# Patient Record
Sex: MALE | Race: BLACK OR AFRICAN AMERICAN | NOT HISPANIC OR LATINO | URBAN - METROPOLITAN AREA
[De-identification: names, ages, dates, MRNs, and addresses within clinical notes are randomized per-mention and may not be internally consistent; named-entity substitution may affect disease eponyms.]

---

## 2023-06-28 ENCOUNTER — OFFICE VISIT (OUTPATIENT)
Dept: URGENT CARE | Facility: CLINIC | Age: 53
End: 2023-06-28
Payer: COMMERCIAL

## 2023-06-28 VITALS
WEIGHT: 130 LBS | DIASTOLIC BLOOD PRESSURE: 82 MMHG | SYSTOLIC BLOOD PRESSURE: 115 MMHG | OXYGEN SATURATION: 97 % | HEIGHT: 67 IN | RESPIRATION RATE: 17 BRPM | HEART RATE: 82 BPM | TEMPERATURE: 98 F | BODY MASS INDEX: 20.4 KG/M2

## 2023-06-28 DIAGNOSIS — B02.9 HERPES ZOSTER WITHOUT COMPLICATION: Primary | ICD-10-CM

## 2023-06-28 DIAGNOSIS — M79.2 NEURALGIA: ICD-10-CM

## 2023-06-28 PROCEDURE — 99203 OFFICE O/P NEW LOW 30 MIN: CPT | Mod: S$GLB,,, | Performed by: PHYSICIAN ASSISTANT

## 2023-06-28 PROCEDURE — 99203 PR OFFICE/OUTPT VISIT, NEW, LEVL III, 30-44 MIN: ICD-10-PCS | Mod: S$GLB,,, | Performed by: PHYSICIAN ASSISTANT

## 2023-06-28 RX ORDER — IBUPROFEN 600 MG/1
600 TABLET ORAL EVERY 8 HOURS PRN
Qty: 30 TABLET | Refills: 0 | Status: SHIPPED | OUTPATIENT
Start: 2023-06-29

## 2023-06-28 RX ORDER — GABAPENTIN 300 MG/1
CAPSULE ORAL
Qty: 90 CAPSULE | Refills: 0 | Status: SHIPPED | OUTPATIENT
Start: 2023-06-28 | End: 2023-08-01

## 2023-06-28 NOTE — PROGRESS NOTES
"Subjective:      Patient ID: Juan Manuel Wayne is a 52 y.o. male.    Vitals:  height is 5' 7" (1.702 m) and weight is 59 kg (130 lb). His oral temperature is 97.5 °F (36.4 °C). His blood pressure is 115/82 and his pulse is 82. His respiration is 17 and oxygen saturation is 97%.     Chief Complaint: Rash    52-year-old male who is overall very healthy presents to urgent care clinic for evaluation.  Complaining of symptoms of started Sunday and today is the 4th day.  Complaining of rash on his left upper back that is radiating to his left anterior chest.  There is blistering other rash and associated significant pain/numbness/tingling.  Pain is rated as 10/10.  There is associated bodyaches. No fever, chills, open wound, injury, or new exposure. took Tylenol for his Symptoms yesterday.  No previous similar symptoms.  No sick contacts.    Medical assistant note:  Pt states that he has a painful rash on his back and abdominal area for the pain 4 days . Pt has used a icy hot patch last night     Rash  This is a new problem. The current episode started in the past 7 days. The problem is unchanged. The affected locations include the back, chest and torso. The rash is characterized by pain and redness. He was exposed to nothing. Pertinent negatives include no anorexia, congestion, cough, diarrhea, eye pain, facial edema, fatigue, fever, joint pain, nail changes, rhinorrhea, shortness of breath, sore throat or vomiting. Treatments tried: icy hot patch. The treatment provided no relief.     Constitution: Negative for activity change, chills, sweating, fatigue, fever and generalized weakness.   HENT:  Negative for ear pain, hearing loss, facial swelling, congestion, postnasal drip, sinus pain, sinus pressure, sore throat, trouble swallowing and voice change.    Neck: Negative for neck pain, neck stiffness and painful lymph nodes.   Cardiovascular:  Negative for chest pain, leg swelling, palpitations, sob on exertion and " passing out.   Eyes:  Negative for eye discharge, eye pain, eye redness, photophobia, vision loss, double vision, blurred vision and eyelid swelling.   Respiratory:  Negative for chest tightness, cough, sputum production, COPD, shortness of breath, wheezing and asthma.    Gastrointestinal:  Negative for abdominal pain, nausea, vomiting, diarrhea, bright red blood in stool, dark colored stools, rectal bleeding, heartburn and bowel incontinence.   Genitourinary:  Negative for dysuria, frequency, urgency, urine decreased, flank pain, bladder incontinence, hematuria and history of kidney stones.   Musculoskeletal:  Positive for pain. Negative for trauma, joint pain, joint swelling, abnormal ROM of joint, muscle cramps and muscle ache.   Skin:  Positive for rash and erythema. Negative for color change, pale and wound.   Allergic/Immunologic: Negative for seasonal allergies, asthma and immunocompromised state.   Neurological:  Positive for numbness and tingling. Negative for dizziness, history of vertigo, light-headedness, passing out, facial drooping, speech difficulty, coordination disturbances, loss of balance, headaches, disorientation, altered mental status, loss of consciousness and seizures.   Hematologic/Lymphatic: Negative for swollen lymph nodes, easy bruising/bleeding and trouble clotting. Does not bruise/bleed easily.   Psychiatric/Behavioral:  Negative for altered mental status and disorientation.       History reviewed. No pertinent past medical history.    Objective:     Physical Exam   Constitutional: He appears well-developed. He is cooperative.  Non-toxic appearance. He does not appear ill. No distress.      Comments:Well-appearing     HENT:   Head: Normocephalic and atraumatic.   Ears:   Right Ear: Hearing, external ear and ear canal normal.   Left Ear: Hearing, external ear and ear canal normal.   Nose: Nose normal.   Eyes: Conjunctivae and EOM are normal. Pupils are equal, round, and reactive to  light. Right eye exhibits no discharge. Left eye exhibits no discharge. Right eye exhibits normal extraocular motion. Left eye exhibits normal extraocular motion. Extraocular movement intact vision grossly intact gaze aligned appropriately   Neck: Phonation normal. Neck supple.   Cardiovascular: Normal rate and regular rhythm.   Pulmonary/Chest: Effort normal. No accessory muscle usage. No respiratory distress. He has no wheezes.   Abdominal: Normal appearance. He exhibits no distension.   Musculoskeletal:      Right lower leg: No edema.      Left lower leg: No edema.      Comments: Moves all extremities with normal tone, strength, and ROM.    Gait normal.   Neurological: no focal deficit. He is alert and at baseline. He has normal motor skills. He displays no weakness, facial symmetry, normal reflexes and no dysarthria. No cranial nerve deficit or sensory deficit. He exhibits normal muscle tone. Gait and coordination normal. Coordination normal.   Skin: Skin is warm, dry, intact, not diaphoretic and rash. Capillary refill takes less than 2 seconds. erythema         Comments: Erythematous vesicular rash at posterior paraspinal T4-5 radiating to left anterior chest.  No open wound, drainage, or warmth.   Psychiatric: His speech is normal and behavior is normal. Mood, affect, judgment and thought content normal.   Nursing note and vitals reviewed.            Assessment:     1. Herpes zoster without complication    2. Neuralgia      On exam, patient is nontoxic appearing and vitals are stable.  Patient is essentially neurovascularly intact on exam.   no concern for anaphylaxis, sepsis, or cellulitis.     We did discuss that he is out of the window for antiviral treatment at this time since it has been 4 days.  Patient was prescribed pain medications and recommended OTC treatments for their symptoms.      We discussed gabapentin taper for his nerve pain.  We discussed side effects/alternatives/benefits/risk and patient  would like to proceed.  He knows to discontinue if he develops severe somnolence, leg swelling, or blurred vision.  We also discussed contact precautions.    If symptoms do not improve/worsens, patient was referred back to PCP for continued outpatient workup and management.      Patient was instructed to return for re-evaluation for any worsening or change in current symptoms. Strict ED versus clinic precautions given in depth. Discharge and follow-up instructions given verbally/printed with the patient who expressed understanding and willingness to comply with my recommendations.  Patient verbalized understanding and agreed with the entirety of plan of care.     Note dictated with voice recognition software, please excuse any grammatical errors.    Plan:       Herpes zoster without complication  -     ibuprofen (ADVIL,MOTRIN) 600 MG tablet; Take 1 tablet (600 mg total) by mouth every 8 (eight) hours as needed for Pain (take with food for pain).  Dispense: 30 tablet; Refill: 0    Neuralgia  -     gabapentin (NEURONTIN) 300 MG capsule; Take 1 capsule (300 mg total) by mouth every evening for 3 days, THEN 1 capsule (300 mg total) every 12 (twelve) hours for 3 days, THEN 1 capsule (300 mg total) every 8 (eight) hours. Stop if you develop severe drowsiness, leg swelling or blurred vision..  Dispense: 90 capsule; Refill: 0             Additional MDM:     Heart Failure Score:   COPD = No    Patient Instructions   PLEASE READ YOUR DISCHARGE INSTRUCTIONS ENTIRELY AS IT CONTAINS IMPORTANT INFORMATION.    Take the valtrex (valcyclovir) 7 day course to completion.     You can take OTC pain relievers for mild pain.      FOR NERVE PAIN:  Take 300mg (1cap) every evening x3days. Then, increase to 300mg (1cap) twice daily x3days. May titrate up to 300mg three times daily as tolerated. Do not drive or operate machinery for 4-6 hours after taking this medication.  DISCONTINUE SEVERE SENSATION LEG SWELLING, OR BLURRED VISION.    Avoid  touching the rash as it can spread. Do not touch your eyes.   If you get a lesion by your eye please be seen by a doctor quickly as this can cause blindness.     Please avoid being around immunocompromised patients or pregnant patients during this time as you are very contagious.    You will no longer be contagious when your lesions form scabbing.       Please return or see your primary care doctor if you develop new or worsening symptoms.     Please arrange follow up with your primary medical clinic as soon as possible. You must understand that you've received an Urgent Care treatment only and that you may be released before all of your medical problems are known or treated. You, the patient, will arrange for follow up as instructed. If your symptoms worsen or fail to improve you should go to the Emergency Room.    WE CANNOT RULE OUT ALL POSSIBLE CAUSES OF YOUR SYMPTOMS IN THE URGENT CARE SETTING PLEASE GO TO THE ER IF YOU FEELS YOUR CONDITION IS WORSENING OR YOU WOULD LIKE EMERGENT EVALUATION.      Patient Education        Shingles Discharge Instructions   About this topic   Shingles or herpes zoster is a painful skin rash. It is caused by a germ called varicella zoster virus.  Shingles infects a nerve and the skin that the nerve supplies. It most often affects only one side of the body. The face, around the eyes, and the forehead are often involved.  The pain, itching, and rash can make people feel uncomfortable. First, you feel pain and then a rash appears as fluid-filled blisters. The fluid in the blisters can infect other people who have never had chicken pox or a vaccine. Those people will develop chicken pox, not shingles. It is important to keep the blisters covered until they dry, heal, and scabs form. Once the blisters dry, you cannot pass the infection to anyone. It may take 3 to 4 weeks for the scabs to fully heal.     What care is needed at home?   Ask your doctor what you need to do when you go home.  Make sure you ask questions if you do not understand what the doctor says.  Take your drugs and apply creams and ointments as ordered by your doctor.  Keep your blisters covered. Do not touch your dressing if you have not washed your hands. Change the dressing every day or if it gets soaked. Wash hands right away after you change your dressing.  Do not touch or scratch the rash.  Ask your doctor when it is safe to take a bath or shower.  You may use mild soap and water to wash your blisters. Pat your skin dry with a clean, dry towel. Do not rub. Make sure not to scratch your blisters when drying yourself.  Avoid cleaning your ears if your blisters are near the ear. You may scratch a blister inside your ear and worsen your condition.  What follow-up care is needed?   Your doctor may ask you to make visits to the office to check on your progress. Be sure to keep these visits.  You doctor may send you to a special doctor for nerves, eyes, or ears. Who you see depends on the part of your body the illness affects.  What drugs may be needed?   The doctor may order drugs to:  Help with pain  Relieve itchiness  Fight the virus  Keep your eyes moist  Numb the affected area  Will physical activity be limited?   Your activity may be limited for a few days. You may not feel like going out if the rash can be seen by other people.  What problems could happen?   Very bad pain that lasts after the rash goes away  Some other infection  Eye problems  Hearing problems  When do I need to call the doctor?   Very bad pain that is not helped by the drugs you are taking  Eyesight changes  Hearing problems  Another infection in the lungs or brain  Helpful tips   Wear loose-fitting clothes.  Use nonstick bandages.  Wear sunglasses when you go out to cover the affected eye and to keep it moist.  Stay away from pregnant women, infants, cancer patients, and people with low immune defenses.  If you see a rash or think that a painful part on your  body may be caused by shingles, see you doctor right away. Early treatment may shorten the length and severity of the illness.  Teach Back: Helping You Understand   The Teach Back Method helps you understand the information we are giving you. After you talk with the staff, tell them in your own words what you learned. This helps to make sure the staff has described each thing clearly. It also helps to explain things that may have been confusing. Before going home, make sure you can do these:  I can tell you about my condition.  I can tell you how I will take care of my blisters.  I can tell you what I will do if I have very bad pain or another infection.  Where can I learn more?   American Academy of Dermatology  https://www.aad.org/public/diseases/a-z/shingles-overview   Center for Disease Control and Prevention  http://www.cdc.gov/shingles/about/overview.html   FamilyDoctor.org  http://familydoctor.org/familydoctor/en/diseases-conditions/shingles/treatment.html   Last Reviewed Date   2020-10-13  Consumer Information Use and Disclaimer   This information is not specific medical advice and does not replace information you receive from your health care provider. This is only a brief summary of general information. It does NOT include all information about conditions, illnesses, injuries, tests, procedures, treatments, therapies, discharge instructions or life-style choices that may apply to you. You must talk with your health care provider for complete information about your health and treatment options. This information should not be used to decide whether or not to accept your health care providers advice, instructions or recommendations. Only your health care provider has the knowledge and training to provide advice that is right for you.  Copyright   Copyright © 2021 [employer] and its affiliates and/or licensors. All rights reserved.

## 2023-06-28 NOTE — LETTER
June 28, 2023    Juan Manuel Wayne  [address] [place] [address]         [facility]  [address], [address]  [place] [address]  Phone: [phone]  Fax: [phone] June 28, 2023     Patient: Juan Manuel Wayne   YOB: 1970   Date of Visit: 6/28/2023       To Whom It May Concern:    It is my medical opinion that Juan Manuel Wayne may return to work on 7/1/2023.    If you have any questions or concerns, please don't hesitate to call.    Sincerely,        Ray Corrales PA-C

## 2023-06-28 NOTE — PATIENT INSTRUCTIONS
PLEASE READ YOUR DISCHARGE INSTRUCTIONS ENTIRELY AS IT CONTAINS IMPORTANT INFORMATION.    Take the valtrex (valcyclovir) 7 day course to completion.     You can take OTC pain relievers for mild pain.      FOR NERVE PAIN:  Take 300mg (1cap) every evening x3days. Then, increase to 300mg (1cap) twice daily x3days. May titrate up to 300mg three times daily as tolerated. Do not drive or operate machinery for 4-6 hours after taking this medication.  DISCONTINUE SEVERE SENSATION LEG SWELLING, OR BLURRED VISION.    Avoid touching the rash as it can spread. Do not touch your eyes.   If you get a lesion by your eye please be seen by a doctor quickly as this can cause blindness.     Please avoid being around immunocompromised patients or pregnant patients during this time as you are very contagious.    You will no longer be contagious when your lesions form scabbing.       Please return or see your primary care doctor if you develop new or worsening symptoms.     Please arrange follow up with your primary medical clinic as soon as possible. You must understand that you've received an Urgent Care treatment only and that you may be released before all of your medical problems are known or treated. You, the patient, will arrange for follow up as instructed. If your symptoms worsen or fail to improve you should go to the Emergency Room.    WE CANNOT RULE OUT ALL POSSIBLE CAUSES OF YOUR SYMPTOMS IN THE URGENT CARE SETTING PLEASE GO TO THE ER IF YOU FEELS YOUR CONDITION IS WORSENING OR YOU WOULD LIKE EMERGENT EVALUATION.      Patient Education        Shingles Discharge Instructions   About this topic   Shingles or herpes zoster is a painful skin rash. It is caused by a germ called varicella zoster virus.  Shingles infects a nerve and the skin that the nerve supplies. It most often affects only one side of the body. The face, around the eyes, and the forehead are often involved.  The pain, itching, and rash can make people feel  uncomfortable. First, you feel pain and then a rash appears as fluid-filled blisters. The fluid in the blisters can infect other people who have never had chicken pox or a vaccine. Those people will develop chicken pox, not shingles. It is important to keep the blisters covered until they dry, heal, and scabs form. Once the blisters dry, you cannot pass the infection to anyone. It may take 3 to 4 weeks for the scabs to fully heal.     What care is needed at home?   Ask your doctor what you need to do when you go home. Make sure you ask questions if you do not understand what the doctor says.  Take your drugs and apply creams and ointments as ordered by your doctor.  Keep your blisters covered. Do not touch your dressing if you have not washed your hands. Change the dressing every day or if it gets soaked. Wash hands right away after you change your dressing.  Do not touch or scratch the rash.  Ask your doctor when it is safe to take a bath or shower.  You may use mild soap and water to wash your blisters. Pat your skin dry with a clean, dry towel. Do not rub. Make sure not to scratch your blisters when drying yourself.  Avoid cleaning your ears if your blisters are near the ear. You may scratch a blister inside your ear and worsen your condition.  What follow-up care is needed?   Your doctor may ask you to make visits to the office to check on your progress. Be sure to keep these visits.  You doctor may send you to a special doctor for nerves, eyes, or ears. Who you see depends on the part of your body the illness affects.  What drugs may be needed?   The doctor may order drugs to:  Help with pain  Relieve itchiness  Fight the virus  Keep your eyes moist  Numb the affected area  Will physical activity be limited?   Your activity may be limited for a few days. You may not feel like going out if the rash can be seen by other people.  What problems could happen?   Very bad pain that lasts after the rash goes away  Some  other infection  Eye problems  Hearing problems  When do I need to call the doctor?   Very bad pain that is not helped by the drugs you are taking  Eyesight changes  Hearing problems  Another infection in the lungs or brain  Helpful tips   Wear loose-fitting clothes.  Use nonstick bandages.  Wear sunglasses when you go out to cover the affected eye and to keep it moist.  Stay away from pregnant women, infants, cancer patients, and people with low immune defenses.  If you see a rash or think that a painful part on your body may be caused by shingles, see you doctor right away. Early treatment may shorten the length and severity of the illness.  Teach Back: Helping You Understand   The Teach Back Method helps you understand the information we are giving you. After you talk with the staff, tell them in your own words what you learned. This helps to make sure the staff has described each thing clearly. It also helps to explain things that may have been confusing. Before going home, make sure you can do these:  I can tell you about my condition.  I can tell you how I will take care of my blisters.  I can tell you what I will do if I have very bad pain or another infection.  Where can I learn more?   American Academy of Dermatology  https://www.aad.org/public/diseases/a-z/shingles-overview   Center for Disease Control and Prevention  http://www.cdc.gov/shingles/about/overview.html   FamilyDoctor.org  http://familydoctor.org/familydoctor/en/diseases-conditions/shingles/treatment.html   Last Reviewed Date   2020-10-13  Consumer Information Use and Disclaimer   This information is not specific medical advice and does not replace information you receive from your health care provider. This is only a brief summary of general information. It does NOT include all information about conditions, illnesses, injuries, tests, procedures, treatments, therapies, discharge instructions or life-style choices that may apply to you. You  must talk with your health care provider for complete information about your health and treatment options. This information should not be used to decide whether or not to accept your health care providers advice, instructions or recommendations. Only your health care provider has the knowledge and training to provide advice that is right for you.  Copyright   Copyright © 2021 [employer] and its affiliates and/or licensors. All rights reserved.

## 2023-07-11 ENCOUNTER — OFFICE VISIT (OUTPATIENT)
Dept: URGENT CARE | Facility: CLINIC | Age: 53
End: 2023-07-11
Payer: COMMERCIAL

## 2023-07-11 VITALS
SYSTOLIC BLOOD PRESSURE: 130 MMHG | HEIGHT: 67 IN | HEART RATE: 83 BPM | TEMPERATURE: 98 F | OXYGEN SATURATION: 98 % | WEIGHT: 130 LBS | RESPIRATION RATE: 16 BRPM | DIASTOLIC BLOOD PRESSURE: 87 MMHG | BODY MASS INDEX: 20.4 KG/M2

## 2023-07-11 DIAGNOSIS — B02.9 HERPES ZOSTER WITHOUT COMPLICATION: Primary | ICD-10-CM

## 2023-07-11 PROCEDURE — 96372 PR INJECTION,THERAP/PROPH/DIAG2ST, IM OR SUBCUT: ICD-10-PCS | Mod: S$GLB,,,

## 2023-07-11 PROCEDURE — 99213 PR OFFICE/OUTPT VISIT, EST, LEVL III, 20-29 MIN: ICD-10-PCS | Mod: 25,S$GLB,,

## 2023-07-11 PROCEDURE — 96372 THER/PROPH/DIAG INJ SC/IM: CPT | Mod: S$GLB,,,

## 2023-07-11 PROCEDURE — 99213 OFFICE O/P EST LOW 20 MIN: CPT | Mod: 25,S$GLB,,

## 2023-07-11 RX ORDER — NAPROXEN 500 MG/1
500 TABLET ORAL 2 TIMES DAILY
Qty: 20 TABLET | Refills: 0 | Status: SHIPPED | OUTPATIENT
Start: 2023-07-11 | End: 2023-07-11

## 2023-07-11 RX ORDER — NAPROXEN 500 MG/1
500 TABLET ORAL 2 TIMES DAILY
Qty: 20 TABLET | Refills: 0 | Status: SHIPPED | OUTPATIENT
Start: 2023-07-11

## 2023-07-11 RX ORDER — MUPIROCIN 20 MG/G
OINTMENT TOPICAL 2 TIMES DAILY
Qty: 15 G | Refills: 0 | Status: SHIPPED | OUTPATIENT
Start: 2023-07-11

## 2023-07-11 RX ORDER — MUPIROCIN 20 MG/G
OINTMENT TOPICAL 2 TIMES DAILY
Qty: 15 G | Refills: 0 | Status: SHIPPED | OUTPATIENT
Start: 2023-07-11 | End: 2023-07-11

## 2023-07-11 RX ORDER — KETOROLAC TROMETHAMINE 30 MG/ML
30 INJECTION, SOLUTION INTRAMUSCULAR; INTRAVENOUS
Status: COMPLETED | OUTPATIENT
Start: 2023-07-11 | End: 2023-07-11

## 2023-07-11 RX ORDER — TRIAMCINOLONE ACETONIDE 1 MG/G
CREAM TOPICAL 2 TIMES DAILY
Qty: 45 G | Refills: 0 | Status: SHIPPED | OUTPATIENT
Start: 2023-07-11 | End: 2023-07-11

## 2023-07-11 RX ORDER — TRIAMCINOLONE ACETONIDE 1 MG/G
CREAM TOPICAL 2 TIMES DAILY
Qty: 45 G | Refills: 0 | Status: SHIPPED | OUTPATIENT
Start: 2023-07-11

## 2023-07-11 RX ADMIN — KETOROLAC TROMETHAMINE 30 MG: 30 INJECTION, SOLUTION INTRAMUSCULAR; INTRAVENOUS at 03:07

## 2023-07-11 NOTE — PATIENT INSTRUCTIONS
Apply steroid cream as directed. Try an oral anti-histamine like zyrtec during the day for itching. Over the counter Pepcid twice per day can be helpful (helps decrease inflammation).  You can try benadryl at night for itching. This will make you drowsy. Make sure not to drive, drink alcohol, operate machinery or take other sedating medications while taking this.      Continue gabapentin as directed for nerve pain.     Try naproxen twice per day as needed for pain. Always take with food and water to avoid GI upset. Stop taking if you develop abdominal pain or blood in stool.  Also, you can take tylenol 650-1000 mg every 8 hours as needed for extra pain relief.     Apply antibiotic ointment (mupirocin) twice per day x 7 days.     Apply ice packs or wet cloths to the rash to reduce itching. Keep cool and stay out of the sun. Leave rash open to the air as much as you can. Avoid using fragrant soaps as this can cause irritation. Only use gentle soaps. Stay moisturized.  Drink plenty of fluids daily (water is best).     The recommended daily fluid intake for men is 3.7 liters (seven 16 oz bottles).     Eat nutritious foods high in antioxidants that include fruits and vegetables. Try a Mediterranean diet to decrease inflammation and will help boost your immune system.     Getting enough sleep at night (7-8 hours), eating nutritious foods, managing stress levels and maintaining physical activity (3 days a week of 45 min of aerobic activity) can all help your immune system.    Follow up with PCP for continued symptoms in 1 week.     Go to the ER if you have shortness of breath, mouth/throat swelling, chest pain, palpitations, dizziness.               ([facility] recommendation)  The Mediterranean diet is based on plant-based foods and healthy fats.     You eat LOTS of vegetables, fruits, beans, lentils, nuts, whole grains (wheat bread, brown rice) & extra virgin olive oil.   ---- These foods are high in fiber and  antioxidants.   ---- These nutrients help reduce inflammation.   ---- Fiber helps regulate bowel movements.   ---- Antioxidants protect you against cancer.     Eat a moderate amount of fish (salmon, herring, mackerel, sardines, anchovies, halibut, rainbow trout, tuna)   ---- (fish are high in omega-3 fatty acids)    Eat a moderate amount of cheese and yogurt.    Eat little or no meat-- eat more poultry (baked chicken, grilled chicken, ground turkey)  ---- avoid red meat and pork (causes inflammation and linked to colon cancer)    Eat little or no sweats, sugary drinks or butter.     Limit your sodium intake. A diet high in salt can increase your blood pressure and predisposes you to a heart attack or stroke.     BENEFITS OF DIET  --- Lowers your risk of cardiovascular disease (heart attack, stroke) and many other diseases.   --- Supports a healthy body weight.   --- Supports a healthy blood sugar, blood pressure and cholesterol.   --- Lowers your risk of metabolic syndrome   --- Supports a heathy balance of good gut bacteria in your digestive system.   --- Lowers your risk for cancer.   --- Helps with brain function and slows decline as we age.   --- Helps you live longer.     Talk to your primary care doctor about a dietician referral for further guidance.

## 2023-07-11 NOTE — PROGRESS NOTES
"Subjective:      Patient ID: Juan Manuel Wayne is a 52 y.o. male.    Vitals:  height is 5' 7" (1.702 m) and weight is 59 kg (130 lb). His oral temperature is 98.1 °F (36.7 °C). His blood pressure is 130/87 and his pulse is 83. His respiration is 16 and oxygen saturation is 98%.     Chief Complaint: Generalized Body Aches    Patient is here generalized body aches. Patient seen on 6/28/23 diagnosed with shingles and given gabapentin. Was not given an anti-viral because rash started 4 days prior to exam. Patient states he his not getting better. He has been taking gabapentin as prescribed. Also taking ibuprofen. He has been soaking in a hot bath which helps. Sig other has been placing bandages daily. No fevers or increase in pain.     Other  This is a recurrent problem. Episode onset: 2 weeks ago. The problem occurs constantly. Associated symptoms include a rash. Pertinent negatives include no abdominal pain, chest pain, chills, coughing, fatigue, fever, vomiting or weakness. Treatments tried: aveeno. The treatment provided mild relief.     Constitution: Negative for chills, fatigue and fever.   Cardiovascular:  Negative for chest pain.   Respiratory:  Negative for cough and shortness of breath.    Gastrointestinal:  Negative for abdominal pain and vomiting.   Musculoskeletal:  Positive for pain.   Skin:  Positive for rash. Negative for erythema.    Objective:     Physical Exam   Constitutional: He is oriented to person, place, and time. He appears well-developed.   HENT:   Head: Normocephalic and atraumatic. Head is without abrasion, without contusion and without laceration.   Ears:   Right Ear: External ear normal.   Left Ear: External ear normal.   Nose: Nose normal.   Mouth/Throat: Oropharynx is clear and moist and mucous membranes are normal.   Eyes: Conjunctivae, EOM and lids are normal. Pupils are equal, round, and reactive to light.   Neck: Trachea normal and phonation normal. Neck supple.   Cardiovascular: " Normal rate, regular rhythm and normal heart sounds.   Pulmonary/Chest: Effort normal and breath sounds normal. No stridor. No respiratory distress.   Musculoskeletal: Normal range of motion.         General: Normal range of motion.   Neurological: He is alert and oriented to person, place, and time.   Skin: Skin is warm, dry, intact, rash and vesicular. Capillary refill takes less than 2 seconds. No abrasion, No burn, No bruising, No erythema and No ecchymosis        Psychiatric: His speech is normal and behavior is normal. Judgment and thought content normal.   Nursing note and vitals reviewed.    Assessment:     1. Herpes zoster without complication        Plan:       Herpes zoster without complication  -     ketorolac injection 30 mg  -     Discontinue: triamcinolone acetonide 0.1% (KENALOG) 0.1 % cream; Apply topically 2 (two) times daily.  Dispense: 45 g; Refill: 0  -     Discontinue: naproxen (NAPROSYN) 500 MG tablet; Take 1 tablet (500 mg total) by mouth 2 (two) times daily.  Dispense: 20 tablet; Refill: 0  -     Discontinue: mupirocin (BACTROBAN) 2 % ointment; Apply topically 2 (two) times daily.  Dispense: 15 g; Refill: 0  -     mupirocin (BACTROBAN) 2 % ointment; Apply topically 2 (two) times daily.  Dispense: 15 g; Refill: 0  -     naproxen (NAPROSYN) 500 MG tablet; Take 1 tablet (500 mg total) by mouth 2 (two) times daily.  Dispense: 20 tablet; Refill: 0  -     triamcinolone acetonide 0.1% (KENALOG) 0.1 % cream; Apply topically 2 (two) times daily.  Dispense: 45 g; Refill: 0      Pt presents with ongoing pain from shingles rash. Pt has rash covered with bandages. There is dried, yellow drainage on bandages. No induration, surrounding erythema, swelling or warmth. Advised to use non-adherent dressings if covered to avoid re-injury of skin. I advised leaving rash open to air. Avoid soaking in bathtub. Advised to continue gabapentin. Toradol IM inj given in the clinic. Naproxen rx and add tylenol q 8  hours. Kenalog and mupirocin ointment prescribed. Advised to apply ice packs to areas, increase fluids and follow up with PCP for ongoing symptoms.         Discussed results/diagnosis/plan with patient in clinic. Strict precautions given to patient to monitor for worsening signs and symptoms. Advised to follow up with PCP or specialist.  Explained side effects of medications prescribed with patient and informed him/her to discontinue use if he/she has any side effects and to inform UC or PCP if this occurs. All questions answered. Strict ED verses clinic return precautions stressed and given in depth. Advised if symptoms worsens of fail to improve he/she should go to the Emergency Room. Discharge and follow-up instructions given verbally/printed with the patient who expressed understanding and willingness to comply with my recommendations. Patient voiced understanding and in agreement with current treatment plan. Patient exits the exam room in no acute distress. Conversant and engaged during discharge discussion, verbalized understanding.